# Patient Record
Sex: MALE | Race: WHITE | URBAN - METROPOLITAN AREA
[De-identification: names, ages, dates, MRNs, and addresses within clinical notes are randomized per-mention and may not be internally consistent; named-entity substitution may affect disease eponyms.]

---

## 2021-07-19 ENCOUNTER — OFFICE VISIT (OUTPATIENT)
Dept: URBAN - METROPOLITAN AREA CLINIC 24 | Facility: CLINIC | Age: 41
End: 2021-07-19
Payer: COMMERCIAL

## 2021-07-19 DIAGNOSIS — H43.11 VITREOUS HEMORRHAGE, RIGHT EYE: Primary | ICD-10-CM

## 2021-07-19 PROCEDURE — 99204 OFFICE O/P NEW MOD 45 MIN: CPT | Performed by: OPHTHALMOLOGY

## 2021-07-19 PROCEDURE — 92134 CPTRZ OPH DX IMG PST SGM RTA: CPT | Performed by: OPHTHALMOLOGY

## 2021-07-19 PROCEDURE — 99205 OFFICE O/P NEW HI 60 MIN: CPT | Performed by: OPTOMETRIST

## 2021-07-19 PROCEDURE — 92134 CPTRZ OPH DX IMG PST SGM RTA: CPT | Performed by: OPTOMETRIST

## 2021-07-19 ASSESSMENT — INTRAOCULAR PRESSURE
OD: 12
OD: 12
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
## 2021-07-19 NOTE — IMPRESSION/PLAN
Impression: Vitreous degen   / HMG OD Plan: HMG OD --  ATTACHED 95% visualized w  Exam.    Reassuring. AMANDO recheck RETINA as Hmg clears in 1wk. Cannot comment on Commotio Retinae. MD RECHECK APD   dilate OD only.
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
## 2021-07-23 ENCOUNTER — OFFICE VISIT (OUTPATIENT)
Dept: URBAN - METROPOLITAN AREA CLINIC 24 | Facility: CLINIC | Age: 41
End: 2021-07-23
Payer: COMMERCIAL

## 2021-07-23 DIAGNOSIS — H43.813 VITREOUS DEGENERATION, BILATERAL: ICD-10-CM

## 2021-07-23 DIAGNOSIS — H18.231 SECONDARY CORNEAL EDEMA OF RIGHT EYE: Primary | ICD-10-CM

## 2021-07-23 PROCEDURE — 99213 OFFICE O/P EST LOW 20 MIN: CPT | Performed by: OPTOMETRIST

## 2021-07-23 RX ORDER — ATROPINE SULFATE 10 MG/ML
1 % SOLUTION/ DROPS OPHTHALMIC
Qty: 5 | Refills: 1 | Status: INACTIVE
Start: 2021-07-23 | End: 2021-07-23

## 2021-07-23 RX ORDER — ATROPINE SULFATE 10 MG/ML
1 % SOLUTION/ DROPS OPHTHALMIC
Qty: 5 | Refills: 1 | Status: INACTIVE
Start: 2021-07-23 | End: 2021-07-28

## 2021-07-23 ASSESSMENT — INTRAOCULAR PRESSURE
OD: 13
OD: 8
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
## 2021-07-28 ENCOUNTER — OFFICE VISIT (OUTPATIENT)
Dept: URBAN - METROPOLITAN AREA CLINIC 24 | Facility: CLINIC | Age: 41
End: 2021-07-28
Payer: COMMERCIAL

## 2021-07-28 PROCEDURE — 92134 CPTRZ OPH DX IMG PST SGM RTA: CPT | Performed by: OPHTHALMOLOGY

## 2021-07-28 PROCEDURE — 99214 OFFICE O/P EST MOD 30 MIN: CPT | Performed by: OPHTHALMOLOGY

## 2021-07-28 PROCEDURE — 76512 OPH US DX B-SCAN: CPT | Performed by: OPHTHALMOLOGY

## 2021-07-28 ASSESSMENT — INTRAOCULAR PRESSURE
OD: 10
OS: 11

## 2021-08-12 ENCOUNTER — OFFICE VISIT (OUTPATIENT)
Dept: URBAN - METROPOLITAN AREA CLINIC 24 | Facility: CLINIC | Age: 41
End: 2021-08-12
Payer: COMMERCIAL

## 2021-08-12 DIAGNOSIS — T26.11XA BURN OF CORNEA AND CONJUNCTIVAL SAC, RIGHT EYE, INIT ENCNTR: Primary | ICD-10-CM

## 2021-08-12 PROCEDURE — 99214 OFFICE O/P EST MOD 30 MIN: CPT | Performed by: OPHTHALMOLOGY

## 2021-08-12 RX ORDER — ERYTHROMYCIN 5 MG/G
OINTMENT OPHTHALMIC
Qty: 1 | Refills: 3 | Status: INACTIVE
Start: 2021-08-12 | End: 2021-08-13

## 2021-08-12 RX ORDER — DOXYCYCLINE HYCLATE 100 MG/1
100 MG TABLET, COATED ORAL
Qty: 30 | Refills: 3 | Status: INACTIVE
Start: 2021-08-12 | End: 2021-08-13

## 2021-08-12 ASSESSMENT — INTRAOCULAR PRESSURE: OS: 14
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
## 2021-08-16 ENCOUNTER — OFFICE VISIT (OUTPATIENT)
Dept: URBAN - METROPOLITAN AREA CLINIC 10 | Facility: CLINIC | Age: 41
End: 2021-08-16
Payer: COMMERCIAL

## 2021-08-16 PROCEDURE — 99213 OFFICE O/P EST LOW 20 MIN: CPT | Performed by: OPHTHALMOLOGY

## 2021-08-16 PROCEDURE — 65778 COVER EYE W/MEMBRANE: CPT | Performed by: OPHTHALMOLOGY

## 2021-08-16 ASSESSMENT — INTRAOCULAR PRESSURE: OS: 12
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
## 2021-08-20 ENCOUNTER — OFFICE VISIT (OUTPATIENT)
Dept: URBAN - METROPOLITAN AREA CLINIC 24 | Facility: CLINIC | Age: 41
End: 2021-08-20
Payer: COMMERCIAL

## 2021-08-20 PROCEDURE — 99213 OFFICE O/P EST LOW 20 MIN: CPT | Performed by: OPHTHALMOLOGY

## 2021-08-20 ASSESSMENT — INTRAOCULAR PRESSURE: OD: 11
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
## 2021-08-24 ENCOUNTER — ADULT PHYSICAL (OUTPATIENT)
Dept: URBAN - METROPOLITAN AREA CLINIC 24 | Facility: CLINIC | Age: 41
End: 2021-08-24
Payer: COMMERCIAL

## 2021-08-24 DIAGNOSIS — Z01.818 ENCOUNTER FOR OTHER PREPROCEDURAL EXAMINATION: Primary | ICD-10-CM

## 2021-08-24 DIAGNOSIS — T26.11XD: ICD-10-CM

## 2021-08-24 PROCEDURE — 99202 OFFICE O/P NEW SF 15 MIN: CPT | Performed by: PHYSICIAN ASSISTANT

## 2021-08-26 ENCOUNTER — SURGERY (OUTPATIENT)
Dept: URBAN - METROPOLITAN AREA SURGERY 12 | Facility: SURGERY | Age: 41
End: 2021-08-26
Payer: COMMERCIAL

## 2021-08-26 DIAGNOSIS — H11.241 SCARRING OF CONJUNCTIVA, RIGHT EYE: ICD-10-CM

## 2021-08-26 DIAGNOSIS — H17.11 CENTRAL CORNEAL OPACITY, RIGHT EYE: ICD-10-CM

## 2021-08-26 DIAGNOSIS — S05.01XS INJURY OF CONJUNCTIVA AND CORNEAL ABRASION WITHOUT FOREIGN BODY, RIGHT EYE, SEQUELA: ICD-10-CM

## 2021-08-26 PROCEDURE — 65780 OCULAR RECONST TRANSPLANT: CPT | Performed by: OPHTHALMOLOGY

## 2021-08-26 RX ORDER — NEOMYCIN SULFATE, POLYMYXIN B SULFATE AND DEXAMETHASONE 3.5; 10000; 1 MG/G; [USP'U]/G; MG/G
OINTMENT OPHTHALMIC
Qty: 2 | Refills: 3 | Status: INACTIVE
Start: 2021-08-26 | End: 2021-11-04

## 2021-08-26 RX ORDER — OXYCODONE HYDROCHLORIDE AND ACETAMINOPHEN 5; 325 MG/1; MG/1
TABLET ORAL
Qty: 25 | Refills: 0 | Status: INACTIVE
Start: 2021-08-26 | End: 2021-08-26

## 2021-08-26 RX ORDER — OXYCODONE HYDROCHLORIDE AND ACETAMINOPHEN 5; 325 MG/1; MG/1
TABLET ORAL
Qty: 25 | Refills: 0 | Status: ACTIVE
Start: 2021-08-26

## 2021-08-27 ENCOUNTER — POST-OPERATIVE VISIT (OUTPATIENT)
Dept: URBAN - METROPOLITAN AREA CLINIC 24 | Facility: CLINIC | Age: 41
End: 2021-08-27
Payer: COMMERCIAL

## 2021-08-27 PROCEDURE — 99024 POSTOP FOLLOW-UP VISIT: CPT | Performed by: OPTOMETRIST
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
## 2021-09-03 ENCOUNTER — POST-OPERATIVE VISIT (OUTPATIENT)
Dept: URBAN - METROPOLITAN AREA CLINIC 24 | Facility: CLINIC | Age: 41
End: 2021-09-03
Payer: COMMERCIAL

## 2021-09-03 PROCEDURE — 99024 POSTOP FOLLOW-UP VISIT: CPT | Performed by: OPTOMETRIST

## 2021-09-03 ASSESSMENT — INTRAOCULAR PRESSURE
OD: 11
OS: 13

## 2021-09-03 NOTE — IMPRESSION/PLAN
Impression: S/P Ocular surface reconstruction with several layers of amniotic membrane, sutured and glued OD - 8 Days. Encounter for surgical aftercare following surgery on a sense organ  Z48.810. Plan: Patient reports pain symptoms dramatically improved, vision improving from previous. Continue all gtts as prescribed. Pt to call immediately if any changes in vision or new onset/worsening symptoms.

## 2021-09-09 ENCOUNTER — OFFICE VISIT (OUTPATIENT)
Dept: URBAN - METROPOLITAN AREA CLINIC 26 | Facility: CLINIC | Age: 41
End: 2021-09-09
Payer: COMMERCIAL

## 2021-09-09 DIAGNOSIS — Z48.810 ENCOUNTER FOR SURGICAL AFTERCARE FOLLOWING SURGERY ON A SENSE ORGAN: Primary | ICD-10-CM

## 2021-09-09 PROCEDURE — 92071 CONTACT LENS FITTING FOR TX: CPT | Performed by: OPHTHALMOLOGY

## 2021-09-09 PROCEDURE — 99024 POSTOP FOLLOW-UP VISIT: CPT | Performed by: OPHTHALMOLOGY
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
## 2021-09-09 NOTE — IMPRESSION/PLAN
Impression: S/P Ocular surface reconstruction with several layers of amniotic membrane, sutured and glued OD. Encounter for surgical aftercare following surgery on a sense organ  Z48.810. Plan: Continues to epithelialize. Remnant of dissolving AMT removed with forceps. BCL Kontour 18.0 placed OD. Cont tobra QID. Cont PFATs ATC Cont Doxy 100mg qd. Cont Vit C 1-2g qd Hold aj while BCL is in. 
Will place Prokera nv

## 2021-09-17 ENCOUNTER — OFFICE VISIT (OUTPATIENT)
Dept: URBAN - METROPOLITAN AREA CLINIC 24 | Facility: CLINIC | Age: 41
End: 2021-09-17
Payer: COMMERCIAL

## 2021-09-17 ENCOUNTER — SURGERY (OUTPATIENT)
Dept: URBAN - METROPOLITAN AREA SURGERY 5 | Facility: SURGERY | Age: 41
End: 2021-09-17
Payer: COMMERCIAL

## 2021-09-17 PROCEDURE — 99215 OFFICE O/P EST HI 40 MIN: CPT | Performed by: OPHTHALMOLOGY

## 2021-09-17 PROCEDURE — 65730 CORNEAL TRANSPLANT: CPT | Performed by: OPHTHALMOLOGY

## 2021-09-17 RX ORDER — PREDNISOLONE ACETATE 10 MG/ML
1 % SUSPENSION/ DROPS OPHTHALMIC
Qty: 5 | Refills: 3 | Status: INACTIVE
Start: 2021-09-17 | End: 2021-10-18

## 2021-09-17 RX ORDER — OFLOXACIN 3 MG/ML
0.3 % SOLUTION/ DROPS OPHTHALMIC
Qty: 5 | Refills: 1 | Status: INACTIVE
Start: 2021-09-17 | End: 2021-10-21

## 2021-09-17 NOTE — IMPRESSION/PLAN
Impression: Perforated corneal ulcer, right eye: H16.071. Plan: AC not formed, BCL fell out. At this point only option is PKP surgery. Visual rehabilitation can take a year or longer. Risks of Keratoplasty include similar risks to any intraocular surgery such as bleeding, cataract, and infection. Risks include rejection, need for additional surgery, need for glasses after, and the risks from the medications used. Steroids should not be stopped without instruction by a doctor. Information packet given. Questions answered. Understands that he is at high risk of corneal transplant failure given LSCD and visual prognosis is poor given trauma to the eye. Goal of PKP is to restore the integrity of the globe. Scheduled emergent PKP OD, administer Vancomycin 500 mg IV x 1 over 45 mins in preop area.
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
## 2021-09-18 ENCOUNTER — POST-OPERATIVE VISIT (OUTPATIENT)
Dept: URBAN - METROPOLITAN AREA CLINIC 10 | Facility: CLINIC | Age: 41
End: 2021-09-18
Payer: COMMERCIAL

## 2021-09-18 PROCEDURE — 99024 POSTOP FOLLOW-UP VISIT: CPT | Performed by: OPTOMETRIST

## 2021-09-18 RX ORDER — DOXYCYCLINE HYCLATE 100 MG/1
100 MG TABLET, COATED ORAL
Qty: 60 | Refills: 3 | Status: INACTIVE
Start: 2021-09-18 | End: 2021-09-21

## 2021-09-18 ASSESSMENT — INTRAOCULAR PRESSURE: OD: 1

## 2021-09-18 NOTE — IMPRESSION/PLAN
Impression: S/P Penetrating keratoplasty OD - 1 Day. Encounter for surgical aftercare following surgery on a sense organ  Z48.810. Plan: Continue doxy/Vit C. Pred/ofloxacin qid, erythromycin bid (once before bed).   RTC as scheduled

## 2021-09-21 ENCOUNTER — OFFICE VISIT (OUTPATIENT)
Dept: URBAN - METROPOLITAN AREA CLINIC 10 | Facility: CLINIC | Age: 41
End: 2021-09-21

## 2021-09-21 DIAGNOSIS — T26.11XS BURN OF CORNEA AND CONJUNCTIVAL SAC, RIGHT EYE, SEQUELA: ICD-10-CM

## 2021-09-21 DIAGNOSIS — H02.051 TRICHIASIS WITHOUT ENTROPIAN RIGHT UPPER EYELID: ICD-10-CM

## 2021-09-21 PROCEDURE — 99024 POSTOP FOLLOW-UP VISIT: CPT | Performed by: OPHTHALMOLOGY

## 2021-09-21 RX ORDER — DOXYCYCLINE HYCLATE 100 MG/1
100 MG TABLET, COATED ORAL
Qty: 60 | Refills: 3 | Status: ACTIVE
Start: 2021-09-21

## 2021-09-21 ASSESSMENT — INTRAOCULAR PRESSURE: OD: 7
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
## 2021-09-21 NOTE — IMPRESSION/PLAN
Impression: Corneal transplant status: Z94.7. Plan: POD#4, graft intact, small area of re-epithialization, BCL 8.6 placed. Cont pred and tobra QID, hold erythro aj while BCL in place.

## 2021-09-23 ENCOUNTER — POST-OPERATIVE VISIT (OUTPATIENT)
Dept: URBAN - METROPOLITAN AREA CLINIC 24 | Facility: CLINIC | Age: 41
End: 2021-09-23

## 2021-09-23 DIAGNOSIS — S05.12XS CONTUSION OF EYEBALL AND ORBITAL TISSUES, LEFT EYE, SEQUELA: ICD-10-CM

## 2021-09-23 PROCEDURE — 99024 POSTOP FOLLOW-UP VISIT: CPT | Performed by: OPHTHALMOLOGY

## 2021-09-23 NOTE — IMPRESSION/PLAN
Impression: Corneal transplant status: Z94.7. Plan: POD#4, graft intact, 3 small areas of re-epithelialization, BCL removed and replaced Kontour. Prognosis poor, discussed with pt. Offered to refer to [facility] for consideration of LSCT if he is a candidate. Pt will decide and let me know. Also discussed repeat sutured AMT. Cont pred and tobra QID, hold erythro aj while BCL in place.

## 2021-10-01 ENCOUNTER — OFFICE VISIT (OUTPATIENT)
Dept: URBAN - METROPOLITAN AREA CLINIC 10 | Facility: CLINIC | Age: 41
End: 2021-10-01

## 2021-10-01 PROCEDURE — 99024 POSTOP FOLLOW-UP VISIT: CPT | Performed by: OPHTHALMOLOGY

## 2021-10-01 NOTE — IMPRESSION/PLAN
Impression: Corneal transplant status: Z94.7. Plan: POW#2, graft intact, graft slowly epithelializing, BCL removed and replaced Kontour. Cont pred and tobra QID, hold erythro aj while BCL in place.
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
## 2021-10-07 ENCOUNTER — POST-OPERATIVE VISIT (OUTPATIENT)
Dept: URBAN - METROPOLITAN AREA CLINIC 24 | Facility: CLINIC | Age: 41
End: 2021-10-07
Payer: COMMERCIAL

## 2021-10-07 PROCEDURE — 99024 POSTOP FOLLOW-UP VISIT: CPT | Performed by: OPHTHALMOLOGY

## 2021-10-07 NOTE — IMPRESSION/PLAN
Impression: Corneal transplant status: Z94.7. Plan: POW#3, graft intact, graft slowly epithelializing, BCL removed and replaced Kontour 16D. Cont pred QID until 10/17 and tobra QID, hold erythro aj while BCL in place.

## 2021-10-13 ENCOUNTER — OFFICE VISIT (OUTPATIENT)
Dept: URBAN - METROPOLITAN AREA CLINIC 24 | Facility: CLINIC | Age: 41
End: 2021-10-13
Payer: COMMERCIAL

## 2021-10-13 PROCEDURE — 99024 POSTOP FOLLOW-UP VISIT: CPT | Performed by: OPTOMETRIST

## 2021-10-13 NOTE — IMPRESSION/PLAN
Impression: Corneal transplant status: Z94.7. Plan: Kontour lens in place. Denies pain. Reports vision improving. Cont pred QID until 10/17 and tobra QID Pt to call immediately if any changes in vision or new onset/worsening symptoms.

## 2021-10-14 ENCOUNTER — PROCEDURE (OUTPATIENT)
Dept: URBAN - METROPOLITAN AREA CLINIC 24 | Facility: CLINIC | Age: 41
End: 2021-10-14
Payer: COMMERCIAL

## 2021-10-14 PROCEDURE — 92071 CONTACT LENS FITTING FOR TX: CPT | Performed by: OPHTHALMOLOGY
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
## 2021-10-18 ENCOUNTER — OFFICE VISIT (OUTPATIENT)
Dept: URBAN - METROPOLITAN AREA CLINIC 44 | Facility: CLINIC | Age: 41
End: 2021-10-18
Payer: COMMERCIAL

## 2021-10-18 PROCEDURE — 99024 POSTOP FOLLOW-UP VISIT: CPT | Performed by: OPHTHALMOLOGY

## 2021-10-18 RX ORDER — PREDNISOLONE ACETATE 10 MG/ML
1 % SUSPENSION/ DROPS OPHTHALMIC
Qty: 5 | Refills: 3 | Status: INACTIVE
Start: 2021-10-18 | End: 2021-11-04
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
## 2021-10-18 NOTE — IMPRESSION/PLAN
Impression: Corneal transplant status: Z94.7. Plan: POW#4, graft with significant haze today, vision worse. Explained to pt that graft could be failing but goal is toavoid perforation. Graft poorly epithelializing, BCL removed and replaced Kontour 16D. Decrease pred to TID until 10/17 and cont tobra QID, hold erythro aj while BCL in place. Will have pt see oculoplastics for options, fornices are shortening and symblepharon progressing causing BCLs to fall out and unable to place [address] this. Pt to be seen for BCL replacement if it falls out.

## 2021-10-21 ENCOUNTER — OFFICE VISIT (OUTPATIENT)
Dept: URBAN - METROPOLITAN AREA CLINIC 10 | Facility: CLINIC | Age: 41
End: 2021-10-21
Payer: COMMERCIAL

## 2021-10-21 PROCEDURE — 99214 OFFICE O/P EST MOD 30 MIN: CPT | Performed by: OPHTHALMOLOGY

## 2021-10-21 PROCEDURE — 92285 EXTERNAL OCULAR PHOTOGRAPHY: CPT | Performed by: OPHTHALMOLOGY

## 2021-10-21 RX ORDER — OFLOXACIN 3 MG/ML
0.3 % SOLUTION/ DROPS OPHTHALMIC
Qty: 5 | Refills: 1 | Status: ACTIVE
Start: 2021-10-21
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
## 2021-10-29 ENCOUNTER — OFFICE VISIT (OUTPATIENT)
Dept: URBAN - METROPOLITAN AREA CLINIC 10 | Facility: CLINIC | Age: 41
End: 2021-10-29
Payer: COMMERCIAL

## 2021-10-29 DIAGNOSIS — Z94.7 CORNEAL TRANSPLANT STATUS: Primary | ICD-10-CM

## 2021-10-29 PROCEDURE — 99024 POSTOP FOLLOW-UP VISIT: CPT | Performed by: OPHTHALMOLOGY
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
## 2021-11-04 ENCOUNTER — OFFICE VISIT (OUTPATIENT)
Dept: URBAN - METROPOLITAN AREA CLINIC 24 | Facility: CLINIC | Age: 41
End: 2021-11-04
Payer: COMMERCIAL

## 2021-11-04 DIAGNOSIS — H47.091: ICD-10-CM

## 2021-11-04 DIAGNOSIS — H02.011 CICATRICIAL ENTROPION OF RIGHT UPPER EYELID: ICD-10-CM

## 2021-11-04 PROCEDURE — 99024 POSTOP FOLLOW-UP VISIT: CPT | Performed by: OPHTHALMOLOGY
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
## 2021-11-11 ENCOUNTER — ADULT PHYSICAL (OUTPATIENT)
Dept: URBAN - METROPOLITAN AREA CLINIC 24 | Facility: CLINIC | Age: 41
End: 2021-11-11
Payer: COMMERCIAL

## 2021-11-11 PROCEDURE — 99213 OFFICE O/P EST LOW 20 MIN: CPT | Performed by: PHYSICIAN ASSISTANT

## 2021-11-17 ENCOUNTER — SURGERY (OUTPATIENT)
Dept: URBAN - METROPOLITAN AREA SURGERY 5 | Facility: SURGERY | Age: 41
End: 2021-11-17
Payer: COMMERCIAL

## 2021-11-17 PROCEDURE — 68328 REVISE/GRAFT EYELID LINING: CPT | Performed by: OPHTHALMOLOGY

## 2021-11-17 PROCEDURE — 14060 TIS TRNFR E/N/E/L 10 SQ CM/<: CPT | Performed by: OPHTHALMOLOGY

## 2021-11-17 RX ORDER — CHLORHEXIDINE GLUCONATE 1.2 MG/ML
0.12 % RINSE ORAL
Qty: 1 | Refills: 2 | Status: ACTIVE
Start: 2021-11-17

## 2021-11-17 RX ORDER — NEOMYCIN SULFATE, POLYMYXIN B SULFATE AND DEXAMETHASONE 3.5; 10000; 1 MG/G; [USP'U]/G; MG/G
OINTMENT OPHTHALMIC
Qty: 2 | Refills: 3 | Status: ACTIVE
Start: 2021-11-17

## 2021-11-17 RX ORDER — HYDROCODONE BITARTRATE AND ACETAMINOPHEN 5; 325 MG/1; MG/1
TABLET ORAL
Qty: 20 | Refills: 0 | Status: ACTIVE
Start: 2021-11-17

## 2021-11-18 ENCOUNTER — OFFICE VISIT (OUTPATIENT)
Dept: URBAN - METROPOLITAN AREA CLINIC 24 | Facility: CLINIC | Age: 41
End: 2021-11-18
Payer: COMMERCIAL

## 2021-11-18 PROCEDURE — 99024 POSTOP FOLLOW-UP VISIT: CPT | Performed by: OPHTHALMOLOGY

## 2021-11-22 ENCOUNTER — POST-OPERATIVE VISIT (OUTPATIENT)
Dept: URBAN - METROPOLITAN AREA CLINIC 10 | Facility: CLINIC | Age: 41
End: 2021-11-22

## 2021-11-22 ENCOUNTER — POST-OPERATIVE VISIT (OUTPATIENT)
Dept: URBAN - METROPOLITAN AREA CLINIC 34 | Facility: CLINIC | Age: 41
End: 2021-11-22

## 2021-11-22 DIAGNOSIS — Z48.89 ENCOUNTER FOR OTHER SPECIFIED SURGICAL AFTERCARE: Primary | ICD-10-CM

## 2021-11-22 PROCEDURE — 99024 POSTOP FOLLOW-UP VISIT: CPT | Performed by: OPHTHALMOLOGY

## 2021-11-22 NOTE — IMPRESSION/PLAN
Impression:  Encounter for other specified surgical aftercare  Z48.89.  Plan: on exam, globe seems to be holding pressure, AC formed, IOP difficult to measure, reading 2mmHg; eyelid/globe separation looks good, symblepharon ring in place, removed BCL, good lid movement/mechanics; will send to Dr. Mac Holding now to inspect globe integrity; pt understands his globe is extremely friable and tenuous, and may not survive long term

## 2021-11-22 NOTE — IMPRESSION/PLAN
Impression: Encounter for other specified surgical aftercare  Z48.89.  Plan: on exam, globe seems to be holding pressure, AC formed, IOP difficult to measure, reading 2mmHg; eyelid/globe separation looks good, symblepharon ring in place, removed BCL, good lid movement/mechanics; will send to Dr. Darryl Chu now to inspect globe integrity; pt understands his globe is extremely friable and tenuous, and may not survive long term
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
## 2021-12-02 ENCOUNTER — OFFICE VISIT (OUTPATIENT)
Dept: URBAN - METROPOLITAN AREA CLINIC 24 | Facility: CLINIC | Age: 41
End: 2021-12-02
Payer: COMMERCIAL

## 2021-12-02 ENCOUNTER — POST-OPERATIVE VISIT (OUTPATIENT)
Dept: URBAN - METROPOLITAN AREA CLINIC 24 | Facility: CLINIC | Age: 41
End: 2021-12-02

## 2021-12-02 DIAGNOSIS — H16.071 PERFORATED CORNEAL ULCER, RIGHT EYE: Primary | ICD-10-CM

## 2021-12-02 PROCEDURE — 99213 OFFICE O/P EST LOW 20 MIN: CPT | Performed by: OPHTHALMOLOGY

## 2021-12-02 PROCEDURE — 99024 POSTOP FOLLOW-UP VISIT: CPT | Performed by: OPHTHALMOLOGY

## 2021-12-02 NOTE — IMPRESSION/PLAN
Impression: Perforated corneal ulcer, right eye: H16.071.  Plan: s/p pkp (failed), s/p fornix reconstruction RUL, no longer adherent to globe; question of whether he has perforated again, I see area of very thin (possible Desmetocele) inferior/nasal  cornea, seeing Dr. Damon Maguire today; I injected 5FU into fornix RUL to discourage scar reformation, removed symblepharon ring today (gave to pt)

## 2021-12-09 ENCOUNTER — OFFICE VISIT (OUTPATIENT)
Dept: URBAN - METROPOLITAN AREA CLINIC 24 | Facility: CLINIC | Age: 41
End: 2021-12-09
Payer: COMMERCIAL

## 2021-12-09 PROCEDURE — 92285 EXTERNAL OCULAR PHOTOGRAPHY: CPT | Performed by: OPHTHALMOLOGY

## 2021-12-09 PROCEDURE — 99213 OFFICE O/P EST LOW 20 MIN: CPT | Performed by: OPHTHALMOLOGY

## 2021-12-09 NOTE — IMPRESSION/PLAN
Impression: Perforated corneal ulcer, right eye: H16.071. Plan: Given the patient's ocular condition, I recommended evisceration of the globe, followed by placement of an orbital implant to provide volume and facilitate prosthesis placement in the future.

## 2021-12-10 NOTE — IMPRESSION/PLAN
Impression: Perforated corneal ulcer, right eye: H16.071. Plan: Repeat perforation noted. Visual prognosis poor. Explained to pt that he is at high risk of repeat corneal melt given loss of stem cells and severe OSD. 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
Discussed Frantz Douglas R/B/A/Cs; would have to refer him to outside specialist for consideration. Pt declines further corneal surgical intervention. At this point, would send to oculoplastics for consideration of eviseration/enucleation.

## 2021-12-10 NOTE — IMPRESSION/PLAN
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 Plan: s/p surgical repair, under the care of DR. Thomason

## 2021-12-10 NOTE — IMPRESSION/PLAN
Impression: Corneal transplant status: Z94.7.
- s/p emergent PKP OD for corneal perforation 9/17/21 Plan: Failed. See above for plan.
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
## 2021-12-14 ENCOUNTER — Encounter (OUTPATIENT)
Dept: URBAN - METROPOLITAN AREA EXTERNAL CLINIC 24 | Facility: EXTERNAL CLINIC | Age: 41
End: 2021-12-14
Payer: COMMERCIAL

## 2021-12-14 PROCEDURE — 65093 REVISE EYE WITH IMPLANT: CPT | Performed by: OPHTHALMOLOGY

## 2021-12-22 ENCOUNTER — POST-OPERATIVE VISIT (OUTPATIENT)
Dept: URBAN - METROPOLITAN AREA CLINIC 10 | Facility: CLINIC | Age: 41
End: 2021-12-22
Payer: COMMERCIAL

## 2021-12-22 PROCEDURE — 99024 POSTOP FOLLOW-UP VISIT: CPT | Performed by: OPHTHALMOLOGY

## 2021-12-22 NOTE — IMPRESSION/PLAN
Impression: S/P reconstruct conjunctival fornix w/ buccal mucous membrane graft,; Entropion repair with suture,; Placement of symblepharon ring and temporarry tarsorrhaphy RUL OD - 35 Days. Encounter for other specified surgical aftercare  Z48.89.  Post operative instructions reviewed - Plan: -- 5FU injected in clinic today 
-- Return in 2weeks for follow up

## 2022-01-06 ENCOUNTER — POST-OPERATIVE VISIT (OUTPATIENT)
Dept: URBAN - METROPOLITAN AREA CLINIC 24 | Facility: CLINIC | Age: 42
End: 2022-01-06
Payer: COMMERCIAL

## 2022-01-06 PROCEDURE — 99024 POSTOP FOLLOW-UP VISIT: CPT | Performed by: OPHTHALMOLOGY

## 2022-01-06 NOTE — IMPRESSION/PLAN
Impression: S/P reconstruct conjunctival fornix w/ buccal mucous membrane graft,; Entropion repair with suture,; Placement of symblepharon ring and temporarry tarsorrhaphy RUL OD - 50 Days. Encounter for other specified surgical aftercare  Z48.89.  Post operative instructions reviewed - Plan: -- Return in 1month for PO appointment 
-- Continue Erythromycin aj QD

## 2022-01-27 ENCOUNTER — POST-OPERATIVE VISIT (OUTPATIENT)
Dept: URBAN - METROPOLITAN AREA CLINIC 24 | Facility: CLINIC | Age: 42
End: 2022-01-27
Payer: COMMERCIAL

## 2022-01-27 PROCEDURE — 99024 POSTOP FOLLOW-UP VISIT: CPT | Performed by: OPHTHALMOLOGY

## 2022-01-27 NOTE — IMPRESSION/PLAN
Impression: S/P reconstruct conjunctival fornix w/ buccal mucous membrane graft,; Entropion repair with suture,; Placement of symblepharon ring and temporarry tarsorrhaphy RUL OD - 71 Days. Encounter for other specified surgical aftercare  Z48.89. Post operative instructions reviewed - Plan: -- advised patient to use Maxitrol aj or Prednisolone gtts as needed (pt has both at home) -- 2month PO

## 2022-03-31 ENCOUNTER — OFFICE VISIT (OUTPATIENT)
Dept: URBAN - METROPOLITAN AREA CLINIC 24 | Facility: CLINIC | Age: 42
End: 2022-03-31
Payer: COMMERCIAL

## 2022-03-31 PROCEDURE — 99024 POSTOP FOLLOW-UP VISIT: CPT | Performed by: OPHTHALMOLOGY

## 2022-04-01 NOTE — IMPRESSION/PLAN
Impression: Encounter for other specified surgical aftercare: Z48.89 OD. Plan: well healed, some hypertrophic con with recurrent adherants superp- nasally. patient is comfortable. question whether further intervention to attempt fornix deepening would be wise. may return to discomfort.

## 2023-02-18 NOTE — IMPRESSION/PLAN
Impression: Secondary corneal edema of right eye: H18.231. Plan: See [facility] plan. 170